# Patient Record
Sex: MALE | URBAN - METROPOLITAN AREA
[De-identification: names, ages, dates, MRNs, and addresses within clinical notes are randomized per-mention and may not be internally consistent; named-entity substitution may affect disease eponyms.]

---

## 2020-09-21 ENCOUNTER — APPOINTMENT (RX ONLY)
Dept: URBAN - METROPOLITAN AREA CLINIC 66 | Facility: CLINIC | Age: 40
Setting detail: DERMATOLOGY
End: 2020-09-21

## 2020-09-21 DIAGNOSIS — D485 NEOPLASM OF UNCERTAIN BEHAVIOR OF SKIN: ICD-10-CM

## 2020-09-21 DIAGNOSIS — D22 MELANOCYTIC NEVI: ICD-10-CM

## 2020-09-21 DIAGNOSIS — L81.4 OTHER MELANIN HYPERPIGMENTATION: ICD-10-CM

## 2020-09-21 PROBLEM — D22.5 MELANOCYTIC NEVI OF TRUNK: Status: ACTIVE | Noted: 2020-09-21

## 2020-09-21 PROBLEM — D48.5 NEOPLASM OF UNCERTAIN BEHAVIOR OF SKIN: Status: ACTIVE | Noted: 2020-09-21

## 2020-09-21 PROCEDURE — ? BIOPSY BY SHAVE METHOD

## 2020-09-21 PROCEDURE — 11102 TANGNTL BX SKIN SINGLE LES: CPT

## 2020-09-21 PROCEDURE — ? COUNSELING

## 2020-09-21 PROCEDURE — 99203 OFFICE O/P NEW LOW 30 MIN: CPT | Mod: 25

## 2020-09-21 PROCEDURE — ? FULL BODY SKIN EXAM

## 2020-09-21 ASSESSMENT — LOCATION SIMPLE DESCRIPTION DERM
LOCATION SIMPLE: ABDOMEN
LOCATION SIMPLE: CHEST

## 2020-09-21 ASSESSMENT — LOCATION ZONE DERM: LOCATION ZONE: TRUNK

## 2020-09-21 ASSESSMENT — LOCATION DETAILED DESCRIPTION DERM
LOCATION DETAILED: EPIGASTRIC SKIN
LOCATION DETAILED: RIGHT MEDIAL SUPERIOR CHEST
LOCATION DETAILED: LEFT LATERAL ABDOMEN

## 2020-09-21 NOTE — HPI: EVALUATION OF SKIN LESION(S)
What Type Of Note Output Would You Prefer (Optional)?: Standard Output
Hpi Title: Evaluation of a Skin Lesion
How Severe Are Your Spot(S)?: mild
Have Your Spot(S) Been Treated In The Past?: has not been treated
Additional History: \\n\\nPatient here today for a full body skin exam

## 2022-09-19 ENCOUNTER — APPOINTMENT (RX ONLY)
Dept: URBAN - METROPOLITAN AREA CLINIC 66 | Facility: CLINIC | Age: 42
Setting detail: DERMATOLOGY
End: 2022-09-19

## 2022-09-19 DIAGNOSIS — L81.4 OTHER MELANIN HYPERPIGMENTATION: ICD-10-CM

## 2022-09-19 DIAGNOSIS — L82.1 OTHER SEBORRHEIC KERATOSIS: ICD-10-CM

## 2022-09-19 DIAGNOSIS — D18.0 HEMANGIOMA: ICD-10-CM

## 2022-09-19 DIAGNOSIS — Z87.2 PERSONAL HISTORY OF DISEASES OF THE SKIN AND SUBCUTANEOUS TISSUE: ICD-10-CM

## 2022-09-19 DIAGNOSIS — D22 MELANOCYTIC NEVI: ICD-10-CM

## 2022-09-19 DIAGNOSIS — L91.8 OTHER HYPERTROPHIC DISORDERS OF THE SKIN: ICD-10-CM

## 2022-09-19 PROBLEM — D22.5 MELANOCYTIC NEVI OF TRUNK: Status: ACTIVE | Noted: 2022-09-19

## 2022-09-19 PROBLEM — D18.01 HEMANGIOMA OF SKIN AND SUBCUTANEOUS TISSUE: Status: ACTIVE | Noted: 2022-09-19

## 2022-09-19 PROCEDURE — ? FULL BODY SKIN EXAM

## 2022-09-19 PROCEDURE — 99213 OFFICE O/P EST LOW 20 MIN: CPT

## 2022-09-19 PROCEDURE — ? COUNSELING

## 2022-09-19 ASSESSMENT — LOCATION SIMPLE DESCRIPTION DERM
LOCATION SIMPLE: LOWER BACK
LOCATION SIMPLE: UPPER BACK

## 2022-09-19 ASSESSMENT — LOCATION DETAILED DESCRIPTION DERM
LOCATION DETAILED: SUPERIOR THORACIC SPINE
LOCATION DETAILED: INFERIOR THORACIC SPINE
LOCATION DETAILED: SUPERIOR LUMBAR SPINE

## 2022-09-19 ASSESSMENT — LOCATION ZONE DERM: LOCATION ZONE: TRUNK

## 2022-09-19 NOTE — HPI: EVALUATION OF SKIN LESION(S)
What Type Of Note Output Would You Prefer (Optional)?: Standard Output
Hpi Title: Evaluation of Skin Lesions
How Severe Are Your Spot(S)?: mild
Have Your Spot(S) Been Treated In The Past?: has not been treated
Additional History: PCP recommended pt to have skin exam because of susp. Mole on scalp.

## 2023-08-28 ENCOUNTER — APPOINTMENT (RX ONLY)
Dept: URBAN - METROPOLITAN AREA CLINIC 66 | Facility: CLINIC | Age: 43
Setting detail: DERMATOLOGY
End: 2023-08-28

## 2023-08-28 DIAGNOSIS — L81.4 OTHER MELANIN HYPERPIGMENTATION: ICD-10-CM

## 2023-08-28 DIAGNOSIS — D22 MELANOCYTIC NEVI: ICD-10-CM

## 2023-08-28 DIAGNOSIS — Z87.2 PERSONAL HISTORY OF DISEASES OF THE SKIN AND SUBCUTANEOUS TISSUE: ICD-10-CM

## 2023-08-28 DIAGNOSIS — L91.8 OTHER HYPERTROPHIC DISORDERS OF THE SKIN: ICD-10-CM

## 2023-08-28 DIAGNOSIS — L82.1 OTHER SEBORRHEIC KERATOSIS: ICD-10-CM

## 2023-08-28 DIAGNOSIS — D485 NEOPLASM OF UNCERTAIN BEHAVIOR OF SKIN: ICD-10-CM

## 2023-08-28 DIAGNOSIS — D18.0 HEMANGIOMA: ICD-10-CM

## 2023-08-28 PROBLEM — D18.01 HEMANGIOMA OF SKIN AND SUBCUTANEOUS TISSUE: Status: ACTIVE | Noted: 2023-08-28

## 2023-08-28 PROBLEM — D48.5 NEOPLASM OF UNCERTAIN BEHAVIOR OF SKIN: Status: ACTIVE | Noted: 2023-08-28

## 2023-08-28 PROBLEM — D23.71 OTHER BENIGN NEOPLASM OF SKIN OF RIGHT LOWER LIMB, INCLUDING HIP: Status: ACTIVE | Noted: 2023-08-28

## 2023-08-28 PROBLEM — D22.9 MELANOCYTIC NEVI, UNSPECIFIED: Status: ACTIVE | Noted: 2023-08-28

## 2023-08-28 PROCEDURE — 99213 OFFICE O/P EST LOW 20 MIN: CPT

## 2023-08-28 PROCEDURE — ? DEFER

## 2023-08-28 PROCEDURE — ? COUNSELING

## 2023-08-28 PROCEDURE — ? FULL BODY SKIN EXAM

## 2023-08-28 PROCEDURE — ? SUNSCREEN RECOMMENDATIONS

## 2023-08-28 ASSESSMENT — LOCATION DETAILED DESCRIPTION DERM: LOCATION DETAILED: LEFT SUPERIOR LATERAL UPPER BACK

## 2023-08-28 ASSESSMENT — LOCATION ZONE DERM: LOCATION ZONE: TRUNK

## 2023-08-28 ASSESSMENT — LOCATION SIMPLE DESCRIPTION DERM: LOCATION SIMPLE: LEFT UPPER BACK

## 2023-08-28 NOTE — PROCEDURE: DEFER
Detail Level: Simple
Size Of Lesion In Cm (Optional): 0
Procedure To Be Performed At Next Visit: Biopsy by shave method
Introduction Text (Please End With A Colon): The following procedure was deferred:

## 2023-08-28 NOTE — PROCEDURE: COUNSELING
Addended by: JAYCOB DANIELS on: 12/22/2019 08:43 PM     Modules accepted: Orders    
Detail Level: Simple
Detail Level: Detailed
Detail Level: Generalized